# Patient Record
Sex: FEMALE | Race: WHITE | URBAN - METROPOLITAN AREA
[De-identification: names, ages, dates, MRNs, and addresses within clinical notes are randomized per-mention and may not be internally consistent; named-entity substitution may affect disease eponyms.]

---

## 2019-05-02 ENCOUNTER — OFFICE VISIT (OUTPATIENT)
Dept: URBAN - METROPOLITAN AREA CLINIC 23 | Facility: CLINIC | Age: 69
End: 2019-05-02
Payer: MEDICARE

## 2019-05-02 DIAGNOSIS — H43.393 OTHER VITREOUS OPACITIES, BILATERAL: ICD-10-CM

## 2019-05-02 PROCEDURE — 92014 COMPRE OPH EXAM EST PT 1/>: CPT | Performed by: OPTOMETRIST

## 2019-05-02 ASSESSMENT — VISUAL ACUITY
OS: 20/30
OD: 20/30

## 2019-05-02 ASSESSMENT — KERATOMETRY
OS: 44.13
OD: 43.88

## 2019-05-02 ASSESSMENT — INTRAOCULAR PRESSURE
OS: 10
OD: 10

## 2019-05-30 ENCOUNTER — OFFICE VISIT (OUTPATIENT)
Dept: URBAN - METROPOLITAN AREA CLINIC 23 | Facility: CLINIC | Age: 69
End: 2019-05-30
Payer: MEDICARE

## 2019-05-30 PROCEDURE — 92012 INTRM OPH EXAM EST PATIENT: CPT | Performed by: OPTOMETRIST

## 2019-05-30 ASSESSMENT — INTRAOCULAR PRESSURE
OD: 14
OS: 12

## 2020-09-22 ENCOUNTER — OFFICE VISIT (OUTPATIENT)
Dept: URBAN - METROPOLITAN AREA CLINIC 23 | Facility: CLINIC | Age: 70
End: 2020-09-22
Payer: MEDICARE

## 2020-09-22 DIAGNOSIS — H16.223 KERATOCONJUNCT SICCA, NOT SPECIFIED AS SJOGREN'S, BILATERAL: Primary | ICD-10-CM

## 2020-09-22 PROCEDURE — 92014 COMPRE OPH EXAM EST PT 1/>: CPT | Performed by: OPTOMETRIST

## 2020-09-22 ASSESSMENT — VISUAL ACUITY
OS: 20/30
OD: 20/25

## 2020-09-22 ASSESSMENT — KERATOMETRY
OS: 44.25
OD: 44.38

## 2020-09-22 ASSESSMENT — INTRAOCULAR PRESSURE
OS: 11
OD: 11

## 2020-09-22 NOTE — IMPRESSION/PLAN
Impression: Other vitreous opacities, bilateral: H43.393 Bilateral. Plan: Discussed diagnosis in detail with patient. Advised patient of condition. No treatment is required at this time. Discussed signs and symptoms of PVD/floaters. Discussed signs and symptoms of retinal detachment. Reassured patient of current condition and treatment. Patient instructed to call if condition gets worse. Will continue to observe.

## 2020-09-22 NOTE — IMPRESSION/PLAN
Impression: Age-related nuclear cataract, bilateral: H25.13 Bilateral. Plan: Cataracts account for the patient's complaints. Discussed diagnosis in detail with patient. Discussed all R/B's and procedures. Patient understands changing glasses prescription will not significantly improve vision. Patient elects to have surgery, phacoemulsification with intraocular lens recommended. Discussed lens options of Toric/Multifocal. Recommend standard IOL, target distance, RL2. Will refer to cataract surgeon for pre-operative evaluation OS>OD Patient interested in multifocal lens.

## 2020-09-22 NOTE — IMPRESSION/PLAN
Impression: Keratoconjunct sicca, not specified as Sjogren's, bilateral: W40.964 Bilateral. Plan: Discussed diagnosis in detail with patient. Reassured patient of dry eyes and treatment. Will continue to observe condition/symptoms. Patient instructed to use ATs QID OU and Genteal gel QHS OU. Patient to call if symptoms progress.

## 2020-10-21 ENCOUNTER — OFFICE VISIT (OUTPATIENT)
Dept: URBAN - METROPOLITAN AREA CLINIC 23 | Facility: CLINIC | Age: 70
End: 2020-10-21
Payer: MEDICARE

## 2020-10-21 DIAGNOSIS — H25.13 AGE-RELATED NUCLEAR CATARACT, BILATERAL: Primary | ICD-10-CM

## 2020-10-21 PROCEDURE — 99204 OFFICE O/P NEW MOD 45 MIN: CPT | Performed by: OPHTHALMOLOGY

## 2020-10-21 RX ORDER — PREDNISOLONE ACETATE 10 MG/ML
1 % SUSPENSION/ DROPS OPHTHALMIC
Qty: 10 | Refills: 1 | Status: INACTIVE
Start: 2020-10-21 | End: 2021-01-20

## 2020-10-21 RX ORDER — OFLOXACIN 3 MG/ML
0.3 % SOLUTION/ DROPS OPHTHALMIC
Qty: 5 | Refills: 1 | Status: INACTIVE
Start: 2020-10-21 | End: 2020-12-08

## 2020-10-21 ASSESSMENT — INTRAOCULAR PRESSURE
OS: 11
OD: 10

## 2020-10-21 NOTE — IMPRESSION/PLAN
Impression: Age-related nuclear cataract, bilateral: H25.13. Condition: quality of life issue. Symptoms: could improve with surgery. Vision: vision affected. Plan: Cataracts account for the patient's complaints. Discussed all risks, benefits, procedures and recovery. Patient understands changing glasses will not improve vision. Patient desires to have surgery, recommend phacoemulsification with intraocular lens. RL-2 Discussed Trifocal IOL vs standard IOL Aim plano. Pt will consider IOL options.

## 2020-10-22 ENCOUNTER — PRE-OPERATIVE VISIT (OUTPATIENT)
Dept: URBAN - METROPOLITAN AREA CLINIC 23 | Facility: CLINIC | Age: 70
End: 2020-10-22
Payer: MEDICARE

## 2020-10-22 PROCEDURE — 76519 ECHO EXAM OF EYE: CPT | Performed by: OPHTHALMOLOGY

## 2020-10-22 ASSESSMENT — PACHYMETRY
OS: 3.21
OD: 23.28
OD: 3.17
OS: 23.50

## 2020-10-29 ENCOUNTER — SURGERY (OUTPATIENT)
Dept: URBAN - METROPOLITAN AREA SURGERY 11 | Facility: SURGERY | Age: 70
End: 2020-10-29
Payer: MEDICARE

## 2020-10-29 PROCEDURE — 66984 XCAPSL CTRC RMVL W/O ECP: CPT | Performed by: OPHTHALMOLOGY

## 2020-10-30 ENCOUNTER — POST-OPERATIVE VISIT (OUTPATIENT)
Dept: URBAN - METROPOLITAN AREA CLINIC 23 | Facility: CLINIC | Age: 70
End: 2020-10-30
Payer: MEDICARE

## 2020-10-30 ASSESSMENT — INTRAOCULAR PRESSURE
OD: 12
OS: 13

## 2020-10-30 NOTE — IMPRESSION/PLAN
Impression: S/P CE/Trifocal IOL TFAT40 +21.50 OS - 1 Day. Encounter for surgical aftercare following surgery on a sense organ  Z48.810. Plan: Use drops as directed above and on handout. Return if sudden vision change or increasing pain. --Continue Ofloxacin 0.3%
--Taper Prednisolone acetate 1% QID x 1 wk, TID x 1 wk, BID x 1wk, QD x 1wk, then d/c
--Advised patient to use artificial tears for comfort.

## 2020-11-05 ENCOUNTER — POST-OPERATIVE VISIT (OUTPATIENT)
Dept: URBAN - METROPOLITAN AREA CLINIC 23 | Facility: CLINIC | Age: 70
End: 2020-11-05
Payer: MEDICARE

## 2020-11-05 ASSESSMENT — VISUAL ACUITY
OD: 20/30
OS: 20/20

## 2020-11-05 ASSESSMENT — INTRAOCULAR PRESSURE
OS: 15
OD: 15

## 2020-11-05 NOTE — IMPRESSION/PLAN
Impression: S/P CE/Trifocal IOL TFAT40 +21.50 OS - 7 Days. Encounter for surgical aftercare following surgery on a sense organ  Z48.810. Plan: Use drops as directed above and on handout. Return if sudden vision change or increasing pain.  Return with Dr. Aguila Durbin to discuss lens options OD d/c ofloxacin after today
prednisolone QID OS X 1 wk, TID OS X 1 wk, BID OS X 1 wk, QD OS X 1 wk then d/c

## 2020-12-08 ENCOUNTER — POST-OPERATIVE VISIT (OUTPATIENT)
Dept: URBAN - METROPOLITAN AREA CLINIC 32 | Facility: CLINIC | Age: 70
End: 2020-12-08

## 2020-12-08 DIAGNOSIS — Z48.810 ENCOUNTER FOR SURGICAL AFTERCARE FOLLOWING SURGERY ON A SENSE ORGAN: Primary | ICD-10-CM

## 2020-12-08 RX ORDER — OFLOXACIN 3 MG/ML
0.3 % SOLUTION/ DROPS OPHTHALMIC
Qty: 5 | Refills: 1 | Status: INACTIVE
Start: 2020-12-08 | End: 2021-01-20

## 2020-12-08 ASSESSMENT — INTRAOCULAR PRESSURE
OS: 15
OD: 13

## 2020-12-08 ASSESSMENT — VISUAL ACUITY
OD: 20/20
OS: 20/25

## 2020-12-08 NOTE — IMPRESSION/PLAN
Impression: S/P CE/Trifocal IOL TFAT40 +21.50 OS - 40 Days. Encounter for surgical aftercare following surgery on a sense organ  Z48.810. Post operative instructions reviewed - Plan: Discussed post-operative visual acuity OS. Discussed treatment options. Discussed observation vs glasses vs refractive surgery. Recommend Lasik OS. R/B/A discussed and understood by patient and patient elects to proceed as indicated. RL-2 Pt defers CEIOL OD at this time. --Advised patient to use artificial tears for comfort.

## 2021-01-20 ENCOUNTER — TESTING ONLY (OUTPATIENT)
Dept: URBAN - METROPOLITAN AREA CLINIC 33 | Facility: CLINIC | Age: 71
End: 2021-01-20

## 2021-01-20 DIAGNOSIS — H52.12 MYOPIA, LEFT EYE: Primary | ICD-10-CM

## 2021-01-20 ASSESSMENT — VISUAL ACUITY
OD: 20/40
OS: 20/25

## 2021-01-20 ASSESSMENT — INTRAOCULAR PRESSURE
OS: 10
OD: 10

## 2021-01-22 RX ORDER — DUREZOL 0.5 MG/ML
0.05 % EMULSION OPHTHALMIC
Qty: 10 | Refills: 1 | Status: ACTIVE
Start: 2021-01-22

## 2021-01-27 ENCOUNTER — POST-OPERATIVE VISIT (OUTPATIENT)
Dept: URBAN - METROPOLITAN AREA CLINIC 23 | Facility: CLINIC | Age: 71
End: 2021-01-27
Payer: MEDICARE

## 2021-01-27 DIAGNOSIS — H53.19 OTHER SUBJECTIVE VISUAL DISTURBANCES: ICD-10-CM

## 2021-01-27 ASSESSMENT — VISUAL ACUITY: OS: 20/30

## 2021-01-27 ASSESSMENT — INTRAOCULAR PRESSURE
OS: 13
OD: 12

## 2021-01-27 NOTE — IMPRESSION/PLAN
Impression: S/P CE/Trifocal IOL TFAT40 +21.50 OS - 90 Days. Encounter for surgical aftercare following surgery on a sense organ  Z48.810. Post operative instructions reviewed - Plan: Discussed post operative visual acuity. Discussed treatment options. Recommend IOL exchange OS. R/B/A discussed and understood by patient and patient elects to proceed as indicated RL-2 Recommend standard IOL aim plano.

## 2021-01-27 NOTE — IMPRESSION/PLAN
Impression: Other subjective visual disturbances: H53.19. Left. Condition: quality of life issue. Symptoms: could improve with surgery. Vision: vision affected. Plan: Discussed diagnosis in detail with patient. Discussed treatment options with patient. Surgical risks and benefits were discussed, explained and understood by patient. Patient elects to have surgery. RL-2 Recommend standard IOL aim plano.  Pt understands that glasses may be needed post-operatively for BCVA

## 2021-03-09 ENCOUNTER — SURGERY (OUTPATIENT)
Dept: URBAN - METROPOLITAN AREA SURGERY 15 | Facility: SURGERY | Age: 71
End: 2021-03-09
Payer: COMMERCIAL

## 2021-03-09 PROCEDURE — 66986 EXCHANGE LENS PROSTHESIS: CPT | Performed by: OPHTHALMOLOGY

## 2021-03-10 ENCOUNTER — POST-OPERATIVE VISIT (OUTPATIENT)
Dept: URBAN - METROPOLITAN AREA CLINIC 23 | Facility: CLINIC | Age: 71
End: 2021-03-10

## 2021-03-10 PROCEDURE — 99024 POSTOP FOLLOW-UP VISIT: CPT | Performed by: OPTOMETRIST

## 2021-03-10 ASSESSMENT — INTRAOCULAR PRESSURE
OD: 18
OS: 10

## 2021-03-10 NOTE — IMPRESSION/PLAN
Impression: S/P Removal/Replace IOL OS - 1 Day. Encounter for surgical aftercare following surgery on a sense organ  Z48.810. Plan: Use drops as directed above and on handout. Return if sudden vision change or increasing pain. Discussed possibility and need to watch for CME post op.  --Continue Ofloxacin 0.3%--Taper Prednisolone acetate 1% QID x 1 wk, TID x 1 wk, BID x 1wk, QD x 1wk, then d/c

## 2021-04-07 ENCOUNTER — POST-OPERATIVE VISIT (OUTPATIENT)
Dept: URBAN - METROPOLITAN AREA CLINIC 23 | Facility: CLINIC | Age: 71
End: 2021-04-07

## 2021-04-07 PROCEDURE — 99024 POSTOP FOLLOW-UP VISIT: CPT | Performed by: OPTOMETRIST

## 2021-04-07 ASSESSMENT — INTRAOCULAR PRESSURE
OS: 11
OD: 11

## 2021-04-07 NOTE — IMPRESSION/PLAN
Impression: S/P Removal/Replace IOL OS - 29 Days. Encounter for surgical aftercare following surgery on a sense organ  Z48.810.  Post operative instructions reviewed - Plan: --Finish Prednisolone acetate 1%

## 2021-05-18 ENCOUNTER — SURGERY (OUTPATIENT)
Dept: URBAN - METROPOLITAN AREA SURGERY 15 | Facility: SURGERY | Age: 71
End: 2021-05-18
Payer: MEDICARE

## 2021-05-18 DIAGNOSIS — H25.11 AGE-RELATED NUCLEAR CATARACT, RIGHT EYE: Primary | ICD-10-CM

## 2021-05-18 PROCEDURE — 66984 XCAPSL CTRC RMVL W/O ECP: CPT | Performed by: OPHTHALMOLOGY

## 2021-05-19 ENCOUNTER — POST-OPERATIVE VISIT (OUTPATIENT)
Dept: URBAN - METROPOLITAN AREA CLINIC 23 | Facility: CLINIC | Age: 71
End: 2021-05-19
Payer: MEDICARE

## 2021-05-19 PROCEDURE — 99024 POSTOP FOLLOW-UP VISIT: CPT | Performed by: OPTOMETRIST

## 2021-05-19 ASSESSMENT — INTRAOCULAR PRESSURE
OD: 14
OS: 12

## 2021-05-19 NOTE — IMPRESSION/PLAN
Impression: S/P Cataract Extraction by phacoemulsification with IOL placement; DEXYCU OD - 1 Day. Presence of intraocular lens  Z96.1. Post operative instructions reviewed - Plan: Return if sudden vision change or increasing pain. Discussed DEXYCU w/ pt. Pt is now scheduled to see Dr. Summer Dempsey in 1 week.

## 2021-05-24 ENCOUNTER — POST-OPERATIVE VISIT (OUTPATIENT)
Dept: URBAN - METROPOLITAN AREA CLINIC 33 | Facility: CLINIC | Age: 71
End: 2021-05-24

## 2021-05-24 DIAGNOSIS — Z96.1 PRESENCE OF INTRAOCULAR LENS: Primary | ICD-10-CM

## 2021-05-24 PROCEDURE — 99024 POSTOP FOLLOW-UP VISIT: CPT | Performed by: OPHTHALMOLOGY

## 2021-05-24 ASSESSMENT — INTRAOCULAR PRESSURE
OS: 10
OD: 11

## 2021-05-24 NOTE — IMPRESSION/PLAN
Impression: S/P Cataract Extraction by phacoemulsification with IOL placement; DEXYCU OD - 6 Days. Presence of intraocular lens  Z96.1. Post operative instructions reviewed - Plan: 3 week post op --Advised patient to use artificial tears for comfort.

## 2021-06-16 ENCOUNTER — POST-OPERATIVE VISIT (OUTPATIENT)
Dept: URBAN - METROPOLITAN AREA CLINIC 23 | Facility: CLINIC | Age: 71
End: 2021-06-16
Payer: COMMERCIAL

## 2021-06-16 PROCEDURE — 99024 POSTOP FOLLOW-UP VISIT: CPT | Performed by: OPHTHALMOLOGY

## 2021-06-16 ASSESSMENT — INTRAOCULAR PRESSURE
OS: 10
OD: 9

## 2021-06-16 ASSESSMENT — VISUAL ACUITY
OS: 20/25
OD: 20/50+

## 2021-06-16 NOTE — IMPRESSION/PLAN
Impression: S/P Cataract Extraction by phacoemulsification with IOL placement; DEXYCU OD - 29 Days. Presence of intraocular lens  Z96.1. Post operative instructions reviewed - Plan: --Advised patient to use artificial tears for comfort.

## 2021-06-29 ENCOUNTER — TESTING ONLY (OUTPATIENT)
Dept: URBAN - METROPOLITAN AREA CLINIC 23 | Facility: CLINIC | Age: 71
End: 2021-06-29

## 2021-06-29 DIAGNOSIS — H52.223 REGULAR ASTIGMATISM, BILATERAL: Primary | ICD-10-CM

## 2021-06-29 PROCEDURE — V2799 MISC VISION ITEM OR SERVICE: HCPCS | Performed by: OPTOMETRIST

## 2021-06-29 PROCEDURE — 92015 DETERMINE REFRACTIVE STATE: CPT | Performed by: OPTOMETRIST

## 2021-06-29 ASSESSMENT — VISUAL ACUITY
OD: 20/30
OS: 20/25

## 2022-06-28 ENCOUNTER — OFFICE VISIT (OUTPATIENT)
Dept: URBAN - METROPOLITAN AREA CLINIC 23 | Facility: CLINIC | Age: 72
End: 2022-06-28
Payer: MEDICARE

## 2022-06-28 DIAGNOSIS — H26.492 OTHER SECONDARY CATARACT, LEFT EYE: Primary | ICD-10-CM

## 2022-06-28 PROCEDURE — 99213 OFFICE O/P EST LOW 20 MIN: CPT | Performed by: OPTOMETRIST

## 2022-06-28 ASSESSMENT — VISUAL ACUITY: OD: 20/40

## 2022-06-28 ASSESSMENT — INTRAOCULAR PRESSURE
OD: 14
OS: 11

## 2022-06-28 NOTE — IMPRESSION/PLAN
Impression: Other secondary cataract, left eye: H26.492. Left. Condition: mild. Plan: Discussed findings. Attempted manifest, no improvement. MAC OCT ordered and reviewed, no pathology noted. Mild PCO in OS, no treatment necessary at this time. Briefly discussed LASIK, do not recommend for pt at this time as rx is low. Increased add power on glasses rx. Call with any vision changes.

## 2024-07-10 ENCOUNTER — OFFICE VISIT (OUTPATIENT)
Dept: URBAN - METROPOLITAN AREA CLINIC 23 | Facility: CLINIC | Age: 74
End: 2024-07-10
Payer: MEDICARE

## 2024-07-10 DIAGNOSIS — Z96.1 PRESENCE OF INTRAOCULAR LENS: ICD-10-CM

## 2024-07-10 DIAGNOSIS — H04.123 DRY EYE SYNDROME OF BILATERAL LACRIMAL GLANDS: Primary | ICD-10-CM

## 2024-07-10 DIAGNOSIS — H35.3121 NONEXUDATIVE MACULAR DEGENERATION, EARLY DRY STAGE, LEFT EYE: ICD-10-CM

## 2024-07-10 DIAGNOSIS — H43.813 VITREOUS DEGENERATION, BILATERAL: ICD-10-CM

## 2024-07-10 PROCEDURE — 92134 CPTRZ OPH DX IMG PST SGM RTA: CPT

## 2024-07-10 PROCEDURE — 99214 OFFICE O/P EST MOD 30 MIN: CPT

## 2024-07-10 ASSESSMENT — INTRAOCULAR PRESSURE
OD: 14
OS: 12

## 2024-07-10 ASSESSMENT — KERATOMETRY
OD: 43.50
OS: 44.50

## 2024-08-12 ENCOUNTER — OFFICE VISIT (OUTPATIENT)
Dept: URBAN - METROPOLITAN AREA CLINIC 23 | Facility: CLINIC | Age: 74
End: 2024-08-12
Payer: COMMERCIAL

## 2024-08-12 DIAGNOSIS — H04.123 DRY EYE SYNDROME OF BILATERAL LACRIMAL GLANDS: ICD-10-CM

## 2024-08-12 DIAGNOSIS — Z96.1 PRESENCE OF INTRAOCULAR LENS: ICD-10-CM

## 2024-08-12 DIAGNOSIS — H52.223 REGULAR ASTIGMATISM, BILATERAL: Primary | ICD-10-CM

## 2024-08-12 PROCEDURE — 92014 COMPRE OPH EXAM EST PT 1/>: CPT

## 2024-08-12 ASSESSMENT — INTRAOCULAR PRESSURE
OS: 13
OD: 14

## 2024-08-12 ASSESSMENT — VISUAL ACUITY
OS: 20/50
OD: 20/30

## 2024-08-12 ASSESSMENT — KERATOMETRY
OD: 43.00
OS: 44.38